# Patient Record
Sex: FEMALE | Race: WHITE
[De-identification: names, ages, dates, MRNs, and addresses within clinical notes are randomized per-mention and may not be internally consistent; named-entity substitution may affect disease eponyms.]

---

## 2020-02-02 ENCOUNTER — HOSPITAL ENCOUNTER (EMERGENCY)
Dept: HOSPITAL 75 - ER | Age: 50
LOS: 1 days | Discharge: HOME | End: 2020-02-03
Payer: SELF-PAY

## 2020-02-02 VITALS — BODY MASS INDEX: 39.55 KG/M2 | HEIGHT: 63.78 IN | WEIGHT: 228.84 LBS

## 2020-02-02 DIAGNOSIS — R07.9: Primary | ICD-10-CM

## 2020-02-02 LAB
ALBUMIN SERPL-MCNC: 3.9 GM/DL (ref 3.2–4.5)
ALP SERPL-CCNC: 60 U/L (ref 40–136)
ALT SERPL-CCNC: 14 U/L (ref 0–55)
APTT BLD: 30 SEC (ref 24–35)
BASOPHILS # BLD AUTO: 0 10^3/UL (ref 0–0.1)
BASOPHILS NFR BLD AUTO: 0 % (ref 0–10)
BILIRUB SERPL-MCNC: 0.2 MG/DL (ref 0.1–1)
BUN/CREAT SERPL: 18
CALCIUM SERPL-MCNC: 9.7 MG/DL (ref 8.5–10.1)
CHLORIDE SERPL-SCNC: 105 MMOL/L (ref 98–107)
CO2 SERPL-SCNC: 23 MMOL/L (ref 21–32)
CREAT SERPL-MCNC: 0.79 MG/DL (ref 0.6–1.3)
EOSINOPHIL # BLD AUTO: 0.2 10^3/UL (ref 0–0.3)
EOSINOPHIL NFR BLD AUTO: 2 % (ref 0–10)
ERYTHROCYTE [DISTWIDTH] IN BLOOD BY AUTOMATED COUNT: 14.1 % (ref 10–14.5)
GFR SERPLBLD BASED ON 1.73 SQ M-ARVRAT: > 60 ML/MIN
GLUCOSE SERPL-MCNC: 129 MG/DL (ref 70–105)
HCT VFR BLD CALC: 42 % (ref 35–52)
HGB BLD-MCNC: 13.7 G/DL (ref 11.5–16)
INR PPP: 1 (ref 0.8–1.4)
LYMPHOCYTES # BLD AUTO: 3 X 10^3 (ref 1–4)
LYMPHOCYTES NFR BLD AUTO: 38 % (ref 12–44)
MAGNESIUM SERPL-MCNC: 1.9 MG/DL (ref 1.6–2.4)
MANUAL DIFFERENTIAL PERFORMED BLD QL: NO
MCH RBC QN AUTO: 30 PG (ref 25–34)
MCHC RBC AUTO-ENTMCNC: 33 G/DL (ref 32–36)
MCV RBC AUTO: 92 FL (ref 80–99)
MONOCYTES # BLD AUTO: 0.7 X 10^3 (ref 0–1)
MONOCYTES NFR BLD AUTO: 8 % (ref 0–12)
NEUTROPHILS # BLD AUTO: 4.1 X 10^3 (ref 1.8–7.8)
NEUTROPHILS NFR BLD AUTO: 52 % (ref 42–75)
PLATELET # BLD: 281 10^3/UL (ref 130–400)
PMV BLD AUTO: 9.8 FL (ref 7.4–10.4)
POTASSIUM SERPL-SCNC: 3.8 MMOL/L (ref 3.6–5)
PROT SERPL-MCNC: 7.5 GM/DL (ref 6.4–8.2)
PROTHROMBIN TIME: 13.1 SEC (ref 12.2–14.7)
SODIUM SERPL-SCNC: 139 MMOL/L (ref 135–145)
WBC # BLD AUTO: 7.9 10^3/UL (ref 4.3–11)

## 2020-02-02 PROCEDURE — 85610 PROTHROMBIN TIME: CPT

## 2020-02-02 PROCEDURE — 80061 LIPID PANEL: CPT

## 2020-02-02 PROCEDURE — 93041 RHYTHM ECG TRACING: CPT

## 2020-02-02 PROCEDURE — 93005 ELECTROCARDIOGRAM TRACING: CPT

## 2020-02-02 PROCEDURE — 36415 COLL VENOUS BLD VENIPUNCTURE: CPT

## 2020-02-02 PROCEDURE — 71045 X-RAY EXAM CHEST 1 VIEW: CPT

## 2020-02-02 PROCEDURE — 83735 ASSAY OF MAGNESIUM: CPT

## 2020-02-02 PROCEDURE — 96375 TX/PRO/DX INJ NEW DRUG ADDON: CPT

## 2020-02-02 PROCEDURE — 80053 COMPREHEN METABOLIC PANEL: CPT

## 2020-02-02 PROCEDURE — 83874 ASSAY OF MYOGLOBIN: CPT

## 2020-02-02 PROCEDURE — 96374 THER/PROPH/DIAG INJ IV PUSH: CPT

## 2020-02-02 PROCEDURE — 85025 COMPLETE CBC W/AUTO DIFF WBC: CPT

## 2020-02-02 PROCEDURE — 85730 THROMBOPLASTIN TIME PARTIAL: CPT

## 2020-02-02 PROCEDURE — 84484 ASSAY OF TROPONIN QUANT: CPT

## 2020-02-03 VITALS — SYSTOLIC BLOOD PRESSURE: 151 MMHG | DIASTOLIC BLOOD PRESSURE: 86 MMHG

## 2020-02-03 LAB
CHOLEST SERPL-MCNC: 137 MG/DL (ref ?–200)
HDLC SERPL-MCNC: 54 MG/DL (ref 40–60)
TRIGL SERPL-MCNC: 86 MG/DL (ref ?–150)
VLDLC SERPL CALC-MCNC: 17 MG/DL (ref 5–40)

## 2020-02-03 NOTE — DIAGNOSTIC IMAGING REPORT
INDICATION: Chest pain.



COMPARISON: None.



FINDINGS: Single view of the chest demonstrates clear lungs

bilaterally. The heart is normal. There is no pneumothorax.

Osseous structures normal.



IMPRESSION: Negative chest.



Dictated by: 



  Dictated on workstation # [id number]

## 2020-02-03 NOTE — ED CHEST PAIN
General


Chief Complaint:  Chest Pain


Stated Complaint:  CP


Nursing Triage Note:  


Patient ambulatory to ER room 10 with spouse with complaint of chest pain that 


began tonight around 21:30. Patient states she was getting ready for bed when 


the chest pain started. She describes the pain as to the center of her chest 


radiating into her back and describes it as heaviness/tightness to the chest. 


Patient states the pain is worse with lying down. She did take Tums 1 hour ago. 


She does complain of nausea.


Nursing Sepsis Screen:  No Definite Risk


Source:  patient


Exam Limitations:  no limitations





History of Present Illness


Date Seen by Provider:  Feb 3, 2020





Allergies and Home Medications


Allergies


Coded Allergies:  


     No Known Drug Allergies (Verified  Allergy, Unknown, 5/26/09)





Past Medical-Social-Family Hx


Patient Social History


Alcohol Use:  Denies Use


Recreational Drug Use:  No


Smoking Status:  Never a Smoker


2nd Hand Smoke Exposure:  No


Recent Foreign Travel:  No


Contact w/Someone Who Travel:  No


Recent Infectious Disease Expo:  No


Recent Hopitalizations:  No


Physical Abuse:  No


Sexual Abuse:  No


Mistreated:  No


Fear:  No





Seasonal Allergies


Seasonal Allergies:  No





Past Medical History


Surgeries:  Yes (shoulder, knee)


Orthopedic


Respiratory:  Yes


Asthma


Cardiac:  No


Neurological:  No


Last Menstrual Period:  Jan 6, 2020


Reproductive Disorders:  No


Sexually Transmitted Disease:  Yes (HPV)


Genitourinary:  No


Gastrointestinal:  No


Musculoskeletal:  No


Endocrine:  No


Cancer:  No


Psychosocial:  No


Blood Disorders:  No





Physical Exam


Vital Signs





Vital Signs - First Documented








 2/2/20 2/3/20





 23:19 02:43


 


Temp 37.1 


 


Pulse 82 


 


Resp 18 


 


B/P (MAP) 168/101 (123) 


 


Pulse Ox  100


 


O2 Delivery Room Air 





Capillary Refill : Less Than 3 Seconds


Height, Weight, BMI


Height: '"


Weight: lbs. oz. kg; 39.00 BMI


Method:





Progress/Results/Core Measures


Results/Orders


Lab Results





Laboratory Tests








Test


 2/2/20


23:30 2/3/20


01:45 Range/Units


 


 


White Blood Count


 7.9 


 


 4.3-11.0


10^3/uL


 


Red Blood Count


 4.54 


 


 4.35-5.85


10^6/uL


 


Hemoglobin 13.7   11.5-16.0  G/DL


 


Hematocrit 42   35-52  %


 


Mean Corpuscular Volume 92   80-99  FL


 


Mean Corpuscular Hemoglobin 30   25-34  PG


 


Mean Corpuscular Hemoglobin


Concent 33 


 


 32-36  G/DL





 


Red Cell Distribution Width 14.1   10.0-14.5  %


 


Platelet Count


 281 


 


 130-400


10^3/uL


 


Mean Platelet Volume 9.8   7.4-10.4  FL


 


Neutrophils (%) (Auto) 52   42-75  %


 


Lymphocytes (%) (Auto) 38   12-44  %


 


Monocytes (%) (Auto) 8   0-12  %


 


Eosinophils (%) (Auto) 2   0-10  %


 


Basophils (%) (Auto) 0   0-10  %


 


Neutrophils # (Auto) 4.1   1.8-7.8  X 10^3


 


Lymphocytes # (Auto) 3.0   1.0-4.0  X 10^3


 


Monocytes # (Auto) 0.7   0.0-1.0  X 10^3


 


Eosinophils # (Auto)


 0.2 


 


 0.0-0.3


10^3/uL


 


Basophils # (Auto)


 0.0 


 


 0.0-0.1


10^3/uL


 


Prothrombin Time 13.1   12.2-14.7  SEC


 


INR Comment 1.0   0.8-1.4  


 


Activated Partial


Thromboplast Time 30 


 


 24-35  SEC





 


Sodium Level 139   135-145  MMOL/L


 


Potassium Level 3.8   3.6-5.0  MMOL/L


 


Chloride Level 105     MMOL/L


 


Carbon Dioxide Level 23   21-32  MMOL/L


 


Anion Gap 11   5-14  MMOL/L


 


Blood Urea Nitrogen 14   7-18  MG/DL


 


Creatinine


 0.79 


 


 0.60-1.30


MG/DL


 


Estimat Glomerular Filtration


Rate > 60 


 


  





 


BUN/Creatinine Ratio 18    


 


Glucose Level 129 H    MG/DL


 


Calcium Level 9.7   8.5-10.1  MG/DL


 


Corrected Calcium 9.8   8.5-10.1  MG/DL


 


Magnesium Level 1.9   1.6-2.4  MG/DL


 


Total Bilirubin 0.2   0.1-1.0  MG/DL


 


Aspartate Amino Transf


(AST/SGOT) 16 


 


 5-34  U/L





 


Alanine Aminotransferase


(ALT/SGPT) 14 


 


 0-55  U/L





 


Alkaline Phosphatase 60     U/L


 


Myoglobin


 20.8 


 


 10.0-92.0


NG/ML


 


Troponin I < 0.028  < 0.028  <0.028  NG/ML


 


Total Protein 7.5   6.4-8.2  GM/DL


 


Albumin 3.9   3.2-4.5  GM/DL


 


Triglycerides Level  86  <150  MG/DL


 


Cholesterol Level  137  < 200  MG/DL


 


LDL Cholesterol Direct  79  1-129  MG/DL


 


VLDL Cholesterol  17  5-40  MG/DL


 


HDL Cholesterol  54  40-60  MG/DL








My Orders





Orders - LISBET HOLCOMB MD


Cbc With Automated Diff (2/2/20 23:21)


Magnesium (2/2/20 23:21)


Chest 1 View, Ap/Pa Only (2/2/20 23:21)


Ekg Tracing (2/2/20 23:21)


Comprehensive Metabolic Panel (2/2/20 23:21)


Myoglobin Serum (2/2/20 23:21)


Protime With Inr (2/2/20 23:21)


Partial Thromboplastin Time (2/2/20 23:21)


O2 (2/2/20 23:21)


Monitor-Rhythm Ecg Trace Only (2/2/20 23:21)


Ed Iv/Invasive Line Start (2/2/20 23:21)


Troponin I (2/2/20 23:21)


Famotidine Injection (Pepcid Injection) (2/2/20 23:30)


Ondansetron Injection (Zofran Injectio (2/2/20 23:30)


Lidocaine 2% Viscous 15 Ml (Xylocaine Vi (2/2/20 23:30)


Antacid  Suspension (Mylanta  Suspension (2/2/20 23:30)


Aspirin Chewable Tablet (Baby Aspirin Ch (2/2/20 23:30)


Troponin I (2/3/20 01:30)


Lipid Panel (2/3/20 01:45)





Medications Given in ED





Current Medications








 Medications  Dose


 Ordered  Sig/Dany


 Route  Start Time


 Stop Time Status Last Admin


Dose Admin


 


 Al Hydrox/Mg


 Hydrox/Simethicone  30 ml  ONCE  ONCE


 PO  2/2/20 23:30


 2/2/20 23:32 DC 2/2/20 23:38


30 ML


 


 Aspirin  324 mg  ONCE  ONCE


 PO  2/2/20 23:30


 2/2/20 23:32 DC 2/2/20 23:35


324 MG


 


 Famotidine  20 mg  ONCE  ONCE


 IVP  2/2/20 23:30


 2/2/20 23:31 DC 2/2/20 23:38


20 MG


 


 Lidocaine HCl  15 ml  ONCE  ONCE


 PO  2/2/20 23:30


 2/2/20 23:32 DC 2/2/20 23:38


15 ML


 


 Ondansetron HCl  4 mg  ONCE  ONCE


 IVP  2/2/20 23:30


 2/2/20 23:32 DC 2/2/20 23:38


4 MG








Vital Signs/I&O











 2/2/20 2/3/20





 23:19 02:43


 


Temp 37.1 37.1


 


Pulse 82 68


 


Resp 18 18


 


B/P (MAP) 168/101 (123) 151/86 (123)


 


Pulse Ox  100


 


O2 Delivery Room Air 














Blood Pressure Mean:                    123








Initial ECG Impression Date:  Feb 2, 2020


Initial ECG Impression Time:  23:20


Initial ECG Rate:  81


Initial ECG Rhythm:  Normal Sinus


Initial ECG Intervals:  Normal


Initial ECG Impression:  Normal


Comment


Normal sinus rhythm with no ST elevation or depression.  No abnormal intervals 

or axis deviation.





Diagnostic Imaging





   Diagonstic Imaging:  Xray


   Plain Films/CT/US/NM/MRI:  chest


Comments


Chest x-ray viewed by me and report not yet available.  No acute abnormalities 

appreciated.





Departure


Impression





   Primary Impression:  


   Chest pain


   Qualified Codes:  R07.9 - Chest pain, unspecified


Disposition:  01 HOME, SELF-CARE


Condition:  Improved





Departure-Patient Inst.


Decision time for Depature:  02:35


Referrals:  


NO,LOCAL PHYSICIAN (PCP/Family)


Primary Care Physician


Patient Instructions:  Chest Pain (DC)





Add. Discharge Instructions:  


Follow-up with your primary care provider soon as possible for monitoring of 

your blood pressure and evaluation of your chest pain


Because your chest pain may be related to acid reflux, take an antacid 

over-the-counter such as Pepcid (famotidine) or Prilosec (omeprazole) 20 mg 

twice daily for the next couple of weeks.


Also avoid the following: Eating large meals, eating close to bedtime, caffeine,

carbonation, chocolate, citrus fruits and juices, tomato products, tobacco, 

alcohol, NSAID medications such as ibuprofen or naproxen, fatty or greasy foods,

mints, spicy food, or anything else you know irritate your stomach.


Take aspirin 81 mg daily until otherwise instructed by your doctor.


Return to the emergency room if you have worsening symptoms.














All discharge instructions reviewed with patient and/or family. Voiced 

understanding.





Copy


Copies To 1:   VALENTE LIZ JOSHUA T MD         Feb 3, 2020 02:37

## 2021-01-18 ENCOUNTER — HOSPITAL ENCOUNTER (OUTPATIENT)
Dept: HOSPITAL 75 - RAD | Age: 51
End: 2021-01-18
Attending: NURSE PRACTITIONER
Payer: COMMERCIAL

## 2021-01-18 DIAGNOSIS — Z12.31: Primary | ICD-10-CM

## 2021-01-18 PROCEDURE — 77063 BREAST TOMOSYNTHESIS BI: CPT

## 2021-01-18 PROCEDURE — 77067 SCR MAMMO BI INCL CAD: CPT

## 2021-01-18 NOTE — DIAGNOSTIC IMAGING REPORT
INDICATION: 

Routine screening.



COMPARISON:    

11/01/2016 and 11/05/2014.



TECHNIQUE: 

2D and 3D bilateral screening mammography was performed with CAD.



FINDINGS:

Scattered fibroglandular densities are identified bilaterally. A

benign nodule in the upper left breast is noted. There are no

spiculated masses or malignant appearing microcalcifications. The

axillae are unremarkable.



IMPRESSION: 

No mammographic features suspicious for malignancy are

identified.



ACR BI-RADS Category 2: Benign findings.

Result letter will be mailed to the patient.

Note: At least 10% of breast cancer is not imaged by mammography.



Dictated by: 



  Dictated on workstation # [id number]

## 2022-01-27 ENCOUNTER — HOSPITAL ENCOUNTER (OUTPATIENT)
Dept: HOSPITAL 75 - PREOP | Age: 52
LOS: 2 days | Discharge: HOME | End: 2022-01-29
Attending: SURGERY
Payer: COMMERCIAL

## 2022-01-27 VITALS — WEIGHT: 218.26 LBS | HEIGHT: 63.98 IN | BODY MASS INDEX: 37.26 KG/M2

## 2022-01-27 DIAGNOSIS — Z01.818: Primary | ICD-10-CM

## 2022-02-03 ENCOUNTER — HOSPITAL ENCOUNTER (OUTPATIENT)
Dept: HOSPITAL 75 - SDC | Age: 52
Discharge: HOME | End: 2022-02-03
Attending: SURGERY
Payer: COMMERCIAL

## 2022-02-03 VITALS — SYSTOLIC BLOOD PRESSURE: 133 MMHG | DIASTOLIC BLOOD PRESSURE: 79 MMHG

## 2022-02-03 VITALS — DIASTOLIC BLOOD PRESSURE: 75 MMHG | SYSTOLIC BLOOD PRESSURE: 126 MMHG

## 2022-02-03 VITALS — SYSTOLIC BLOOD PRESSURE: 136 MMHG | DIASTOLIC BLOOD PRESSURE: 75 MMHG

## 2022-02-03 VITALS — DIASTOLIC BLOOD PRESSURE: 74 MMHG | SYSTOLIC BLOOD PRESSURE: 125 MMHG

## 2022-02-03 VITALS — HEIGHT: 63.98 IN | WEIGHT: 218.26 LBS | BODY MASS INDEX: 37.26 KG/M2

## 2022-02-03 VITALS — SYSTOLIC BLOOD PRESSURE: 122 MMHG | DIASTOLIC BLOOD PRESSURE: 75 MMHG

## 2022-02-03 VITALS — DIASTOLIC BLOOD PRESSURE: 75 MMHG | SYSTOLIC BLOOD PRESSURE: 139 MMHG

## 2022-02-03 VITALS — DIASTOLIC BLOOD PRESSURE: 70 MMHG | SYSTOLIC BLOOD PRESSURE: 125 MMHG

## 2022-02-03 VITALS — SYSTOLIC BLOOD PRESSURE: 120 MMHG | DIASTOLIC BLOOD PRESSURE: 69 MMHG

## 2022-02-03 VITALS — SYSTOLIC BLOOD PRESSURE: 129 MMHG | DIASTOLIC BLOOD PRESSURE: 76 MMHG

## 2022-02-03 VITALS — SYSTOLIC BLOOD PRESSURE: 120 MMHG | DIASTOLIC BLOOD PRESSURE: 49 MMHG

## 2022-02-03 VITALS — SYSTOLIC BLOOD PRESSURE: 138 MMHG | DIASTOLIC BLOOD PRESSURE: 78 MMHG

## 2022-02-03 DIAGNOSIS — Z79.899: ICD-10-CM

## 2022-02-03 DIAGNOSIS — J45.909: ICD-10-CM

## 2022-02-03 DIAGNOSIS — I10: ICD-10-CM

## 2022-02-03 DIAGNOSIS — E66.9: ICD-10-CM

## 2022-02-03 DIAGNOSIS — K80.10: Primary | ICD-10-CM

## 2022-02-03 PROCEDURE — 88304 TISSUE EXAM BY PATHOLOGIST: CPT

## 2022-02-03 PROCEDURE — 76000 FLUOROSCOPY <1 HR PHYS/QHP: CPT

## 2022-02-03 PROCEDURE — 87081 CULTURE SCREEN ONLY: CPT

## 2022-02-03 PROCEDURE — 84703 CHORIONIC GONADOTROPIN ASSAY: CPT

## 2022-02-03 RX ADMIN — SODIUM CHLORIDE, SODIUM LACTATE, POTASSIUM CHLORIDE, AND CALCIUM CHLORIDE PRN MLS/HR: 600; 310; 30; 20 INJECTION, SOLUTION INTRAVENOUS at 08:22

## 2022-02-03 RX ADMIN — SODIUM CHLORIDE, SODIUM LACTATE, POTASSIUM CHLORIDE, AND CALCIUM CHLORIDE PRN MLS/HR: 600; 310; 30; 20 INJECTION, SOLUTION INTRAVENOUS at 11:36

## 2022-02-03 NOTE — DIAGNOSTIC IMAGING REPORT
INDICATION: Fluoroscopy during intraoperative cholangiogram.



Fluoroscopy was provided in the OR during intraoperative

cholangiogram. 12 seconds of fluoroscopic time was utilized. 62

images were obtained. Images demonstrate contrast being injected

via the cystic duct remnant. Intrahepatic and extrahepatic bile

ducts are normal caliber. There are no filling defects to suggest

retained stone. Contrast flows into the duodenum.



IMPRESSION: Fluoroscopy during intraoperative cholangiogram.



Dictated by: 



  Dictated on workstation # [id number]

## 2022-02-03 NOTE — DISCHARGE INST-SIMPLE/STANDARD
Discharge Inst-Standard


Discharge Medications


New, Converted or Re-Newed RX:  Transmitted to Pharmacy





Patient Instructions/Follow Up


Plan of Care/Instructions/FU:  


2-3 weeks ruthie


Activity as Tolerated:  No


Discharge Diet:  Regular Diet


Other Inst to Patient


Follow up Appt:


Make appointment for 2 weeks.





Instructions:


No lifting greater than 10 pounds.


No strenuous activity. 


May shower in 24 hours, no tub bath or soaking.


Use incentive spirometer at home as directed.


No Smoking





Skin/Wound Care:


You have special glue over incision, it will fall off on it's own.





Symptoms to Report:


Appetite Changes, Extremity Discoloration, Numbness/Tingling, Swelling 

Increased, Bleeding Excessive, Eyesight Changes, Pain Increased, Urine Color 

Change, Constipation(Persistent), Fever over 101 degree F, Pain/Pressure in 

chest, Urinating Difficulty, Cough Up/Vomit Blood, Heart Beat Irreg/Pounding, 

Pain/Pressure in jaw, Vaginal Bleeding Increase, Cramps in feet or legs, 

Lightheadedness, Pain/Pressure in shoulder, Diarrhea(Persistent), Memory Changes

Suddenly, Questions/Concerns, Weight gain consecutive days, Dizziness/Fainting, 

Nausea/Vomiting, Shortness of Breath, Weight gain over 2 pounds.





If eyes or skin turn yellow notify physician.








If questions or concerns contact your physician 


Or seek help at emergency department.











JOCY HUI DO               Feb 3, 2022 10:34

## 2022-02-03 NOTE — PROGRESS NOTE-POST OPERATIVE
Post-Operative Progess Note


Surgeon (s)/Assistant (s)


Surgeon


JOCY HUI DO


Assistant:  Dr. Palafox to assist in retraction dissection and closure.





Pre-Operative Diagnosis


cholelithiasis





Post-Operative Diagnosis





same





Procedure & Operative Findings


Date of Procedure


2/3/22


Procedure Performed/Findings


  


PROCEDURE:


Laparoscopic cholecystectomy with intraoperative


cholangiogram. 


 


COMPLICATIONS:


None. 


 


PROCEDURE:


The patient was taken to the operating suite and was prepped and


draped in sterile fashion. A surgical pause was performed. Just


superior to the umbilicus, a 12 mm incision was made. Dissection


was taken down to the fascia, which was then scored and grasped


with a Kocher and the abdomen was then entered.  A 0 Vicryl


suture was placed in a figure-of-eight fashion and a [place]


trocar was placed and secured. Pneumoperitoneum was achieved.


A 5mm trochar place in the subxyphoid and 2 in the right upper quadrant.


The gallbladder was then grasped and elevated. The


cystic duct, and cystic artery were then 


dissected out. Clip was placed on the distal


portion of the cystic duct which was then partially transected.


An arrow catheter was inserted into the duct. 


The cholangiogram was then performed. No filing defects and contrast


made its way into the duodenum.  Catheter removed. Clips were placed


on proximal portion of the cystic duct and then the duct was then


transected. Clips were placed along the proximal and distal


portion of the cystic artery which was then transected. Hook


cautery was used to dissect the gallbladder from the gallbladder


fossa achieving hemostasis. The gallbladder was placed in an


Endobag and removed through the 12 mm trocar site. The abdomen


was then reinspected.  Copious amounts of irrigation were used to


irrigate the abdomen and there were no signs of active bleeding.


Slight ooze from gallbladder fossa so piece of surgicel placed in it.


Hemostasis had been achieved. The 12 mm fascial defect was then


closed with 0 Vicryl suture that had been placed in a


figure-of-eight fashion. The abdomen was then desufflated, the


trocars were removed. The abdomen was then washed and dried. The


skin was then closed using 4-0 Monocryl in a subcuticular fashion.


The abdomen was washed and dried and Skin Affix was place over incisions.


Patient tolerated the procedure well without any complications 


and was taken to the recovery room in stable condition.


Anesthesia Type


general





Estimated Blood Loss


Estimated blood loss (mL):  minimal





Specimens/Packing


Specimens Removed


gallbladder


Packing:  


surgshakirl











JOCY HUI DO               Feb 3, 2022 10:36

## 2022-02-03 NOTE — PROGRESS NOTE-PRE OPERATIVE
Pre-Operative Progress Note


H&P Reviewed


The H&P was reviewed, patient examined and no changes noted.


Date Seen by Provider:  Feb 3, 2022


Time Seen by Provider:  08:00


Date H&P Reviewed:  Feb 3, 2022


Time H&P Reviewed:  08:00


Pre-Operative Diagnosis:  cholelithiasis











JOCY HUI DO               Feb 3, 2022 08:00

## 2022-02-03 NOTE — ANESTHESIA-GENERAL POST-OP
General


Patient Condition


Mental Status/LOC:  Same as Preop


Cardiovascular:  Satisfactory


Nausea/Vomiting:  Absent


Respiratory:  Satisfactory


Pain:  Controlled


Complications:  Absent





Post Op Complications


Complications


None





Follow Up Care/Instructions


Patient Instructions


None needed.





Anesthesia/Patient Condition


Patient Condition


Patient is doing well, no complaints, stable vital signs, no apparent adverse 

anesthesia problems.   


No complications reported per nursing.


D/C home per [facility] Criteria:  Yes











LUCRECIA HUSSEIN CRNA            Feb 3, 2022 11:44

## 2022-02-22 ENCOUNTER — HOSPITAL ENCOUNTER (OUTPATIENT)
Dept: HOSPITAL 75 - RAD | Age: 52
End: 2022-02-22
Attending: PHYSICIAN ASSISTANT
Payer: COMMERCIAL

## 2022-02-22 DIAGNOSIS — Z12.31: Primary | ICD-10-CM

## 2022-02-22 PROCEDURE — 77063 BREAST TOMOSYNTHESIS BI: CPT

## 2022-02-22 PROCEDURE — 77067 SCR MAMMO BI INCL CAD: CPT

## 2022-02-22 NOTE — DIAGNOSTIC IMAGING REPORT
INDICATION: 

Routine screening.



COMPARISON:  

01/18/2021 and 11/01/2016.



TECHNIQUE: 

2D and 3D bilateral screening mammography was performed with CAD.



FINDINGS:

Scattered fibroglandular densities are identified bilaterally.

The parenchymal pattern is stable. No dominant mass or

malignant-appearing microcalcifications are seen. Axillae are

unremarkable.



IMPRESSION: 

No mammographic features suspicious for malignancy are

identified.



ACR BI-RADS Category 1: Negative.

Result letter will be mailed to the patient.

Note:  At least 10% of breast cancer is not imaged by

mammography.



Dictated by: 



  Dictated on workstation # [id number]

## 2022-03-24 ENCOUNTER — HOSPITAL ENCOUNTER (OUTPATIENT)
Dept: HOSPITAL 75 - ORTHO | Age: 52
End: 2022-03-24
Attending: ORTHOPAEDIC SURGERY
Payer: COMMERCIAL

## 2022-03-24 DIAGNOSIS — M17.11: Primary | ICD-10-CM

## 2022-03-24 PROCEDURE — 99203 OFFICE O/P NEW LOW 30 MIN: CPT

## 2022-07-20 ENCOUNTER — HOSPITAL ENCOUNTER (OUTPATIENT)
Dept: HOSPITAL 75 - RAD | Age: 52
End: 2022-07-20
Attending: NURSE PRACTITIONER
Payer: COMMERCIAL

## 2022-07-20 DIAGNOSIS — Z96.651: Primary | ICD-10-CM

## 2022-07-20 DIAGNOSIS — Z86.718: ICD-10-CM

## 2022-07-20 NOTE — DIAGNOSTIC IMAGING REPORT
INDICATION: DVT. Status post previous total knee arthroplasty.



COMPARISON: None.



TECHNIQUE: Duplex, gray-scale and color-flow imaging of the right

lower extremity venous system was performed. 



FINDINGS: The common femoral vein, superficial femoral vein,

profunda femoris, and popliteal veins are normal. These vessels

show normal compressibility, color flow, and Doppler

augmentation. The deep calf veins, although not very well seen,

demonstrate no distinct intraluminal thrombus. 



IMPRESSION:  Negative venous Doppler of the right lower

extremity.



Dictated by: 



  Dictated on workstation # [id number]

## 2022-12-20 ENCOUNTER — HOSPITAL ENCOUNTER (OUTPATIENT)
Dept: HOSPITAL 75 - PREOP | Age: 52
End: 2022-12-20
Attending: SURGERY
Payer: COMMERCIAL

## 2022-12-20 VITALS — HEIGHT: 64.02 IN | WEIGHT: 214.51 LBS | BODY MASS INDEX: 36.62 KG/M2

## 2022-12-20 DIAGNOSIS — Z01.818: Primary | ICD-10-CM

## 2022-12-27 ENCOUNTER — HOSPITAL ENCOUNTER (OUTPATIENT)
Dept: HOSPITAL 75 - ENDO | Age: 52
Discharge: HOME | End: 2022-12-27
Attending: SURGERY
Payer: COMMERCIAL

## 2022-12-27 VITALS — SYSTOLIC BLOOD PRESSURE: 121 MMHG | DIASTOLIC BLOOD PRESSURE: 78 MMHG

## 2022-12-27 VITALS — DIASTOLIC BLOOD PRESSURE: 73 MMHG | SYSTOLIC BLOOD PRESSURE: 119 MMHG

## 2022-12-27 VITALS — SYSTOLIC BLOOD PRESSURE: 142 MMHG | DIASTOLIC BLOOD PRESSURE: 85 MMHG

## 2022-12-27 VITALS — HEIGHT: 64.02 IN | WEIGHT: 214.51 LBS | BODY MASS INDEX: 36.62 KG/M2

## 2022-12-27 DIAGNOSIS — E66.9: ICD-10-CM

## 2022-12-27 DIAGNOSIS — Z12.11: Primary | ICD-10-CM

## 2022-12-27 DIAGNOSIS — K57.30: ICD-10-CM

## 2022-12-27 NOTE — DISCHARGE INST-SIMPLE/STANDARD
Discharge Inst-Standard


Patient Instructions/Follow Up


Plan of Care/Instructions/FU:  


Follow-up for repeat colonoscopy in 10 years. If any issues prior to that,


please contact Dr. Wood's office.


Activity as Tolerated:  Yes


Discharge Diet:  Regular Diet (High fiber)











JOCY WOOD DO              Dec 27, 2022 10:17

## 2022-12-27 NOTE — OPERATIVE REPORT
DATE OF SERVICE: 12/27/2022



PREOPERATIVE DIAGNOSIS:

Screening colonoscopy.



POSTOPERATIVE DIAGNOSIS:

Diverticulosis.



PROCEDURE:

Colonoscopy.



SURGEON:

Jocy Wood DO.



ANESTHESIA:

Per CRNA.



ESTIMATED BLOOD LOSS:

None.



COMPLICATIONS:

None.



INDICATIONS:

The patient is a 52-year-old female with the need for screening colonoscopy.  

She understands risks and benefits of procedure and wished to proceed.  Consent 

was signed in chart.



DESCRIPTION OF PROCEDURE:

The patient was taken to the endoscopy suite, placed in left lateral position.  

Timeout was performed.  Digital rectal exam was performed.  No palpable polyps, 

masses or ulcerations.  Scope was inserted in the rectum and advanced all the 

way to the cecum with minimal difficulty.  Prep was adequate.  Scope was then 

slowly retracted back.  No polyps, masses or ulcerations in the cecum, 

ascending, transverse, descending and sigmoid colon.  Very minimal amount of 

diverticulosis throughout the colon.  Once in the rectum, scope was retroflexed,

noting no other pathology.  Scope returned to its normal position, slowly 

withdrawn until completely removed.  The patient tolerated the procedure well 

without complications.  She was taken to recovery room in stable condition.



RECOMMENDATIONS:

The patient will have repeat colonoscopy in 10 years.  Any issues before that be

seen at that time.





Job ID: [id number]

DocumentID: [id number]

Dictated Date: 12/27/2022 10:27:36

Transcription Date: 12/27/2022 15:40:00

Dictated By: JOCY WOOD DO

## 2022-12-27 NOTE — ANESTHESIA-GENERAL POST-OP
MAC


Patient Condition


Mental Status/LOC:  Same as Preop


Cardiovascular:  Satisfactory


Nausea/Vomiting:  Absent


Respiratory:  Satisfactory


Pain:  Controlled


Complications:  Absent





Post Op Complications


Complications


None





Follow Up Care/Instructions


Patient Instructions


None needed.





Anesthesiology Discharge Order


Discharge Order


Patient is doing well, no complaints, stable vital signs, no apparent adverse 

anesthesia problems.   


No complications reported per nursing.











JOHN DANIELS CRNA         Dec 27, 2022 12:12

## 2023-03-20 ENCOUNTER — HOSPITAL ENCOUNTER (OUTPATIENT)
Dept: HOSPITAL 75 - RAD | Age: 53
End: 2023-03-20
Attending: NURSE PRACTITIONER
Payer: COMMERCIAL

## 2023-03-20 DIAGNOSIS — Z12.31: Primary | ICD-10-CM

## 2023-03-20 PROCEDURE — 77067 SCR MAMMO BI INCL CAD: CPT

## 2023-03-20 PROCEDURE — 77063 BREAST TOMOSYNTHESIS BI: CPT

## 2023-03-21 NOTE — DIAGNOSTIC IMAGING REPORT
3D bilateral screening mammogram with CAD.



This study was compared to the prior exams of 02/22/2022,

1/18/2021 and 11/1/2016. 



At this time there are no current complaints. 



The current study was also evaluated with a Computer Aided

Detection (CAD) system.



FINDINGS: There are scattered fibroglandular densities in both

breasts which could obscure a lesion. Overall, there does not

appear to have been any significant change when compared to the

prior exam. No primary or secondary sign of malignancy is noted.



IMPRESSION: There is no radiographic evidence for malignancy.



ACR BI-RADS Category 1: Negative.

Result letter will be mailed to the patient.

Note:  At least 10% of breast cancer is not imaged by

mammography.



Dictated by: 



  Dictated on workstation # [id number]

## 2023-05-08 ENCOUNTER — HOSPITAL ENCOUNTER (EMERGENCY)
Dept: HOSPITAL 75 - ER | Age: 53
Discharge: HOME | End: 2023-05-08
Payer: COMMERCIAL

## 2023-05-08 VITALS — SYSTOLIC BLOOD PRESSURE: 111 MMHG | DIASTOLIC BLOOD PRESSURE: 73 MMHG

## 2023-05-08 DIAGNOSIS — M25.561: Primary | ICD-10-CM
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
## 2023-05-08 DIAGNOSIS — Z96.651: ICD-10-CM
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
## 2023-05-08 DIAGNOSIS — M25.461: ICD-10-CM
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
## 2023-05-08 PROCEDURE — 73562 X-RAY EXAM OF KNEE 3: CPT

## 2023-05-08 PROCEDURE — 99283 EMERGENCY DEPT VISIT LOW MDM: CPT

## 2023-05-08 NOTE — DIAGNOSTIC IMAGING REPORT
Indication: Right knee pain



AP, oblique, and lateral views of the right knee are obtained.



No fracture or acute bony abnormality seen. Right knee prosthesis

appears in good alignment. There is no sign of device loosening.



Impression:  Well-aligned right knee prosthesis with no acute

bone abnormality.



Dictated by: 



  Dictated on workstation # [id number]

## 2023-05-08 NOTE — ED LOWER EXTREMITY
General


Chief Complaint:  Lower Extremity


Stated Complaint:  RT KNEE INJ | WC





History of Present Illness


Date Seen by Provider:  May 8, 2023


Time Seen by Provider:  14:08


Initial Comments


Patient is a 52-year-old female who presents to the emergency room with a chief 

complaint of knee pain.  Patient is status post knee replacement July of last 

year.  Dr. Harris at [facility] states did her knee replacement.  She states she 

was at work today sitting on the floor and went to get up and felt and heard a 

pop.  She has had pain with ambulation since.  No numbness tingling or weakness.

 She reports anterior and posterior knee pain.  Did not fall.  No direct trauma.

 No other complaints of pain or illness.


Onset:  this afternoon


Severity:  moderate


Pain/Injury Location:  right knee


Method of Injury:  twisted


Modifying Factors:  Improves With Immobilization





Allergies and Home Medications


Allergies


Coded Allergies:  


     No Known Drug Allergies (Verified , 12/20/22)





Patient Home Medication List


Home Medication List Reviewed:  Yes


Cetirizine HCl (Zyrtec) 10 Mg Capsule, 10 MG PO DAILY PRN for PRN, (Reported)


   Entered as Reported by: DARRICK PHAN on 1/29/22 1102


Fluticasone Propionate (Flonase Allergy Relief) 50 Mcg/Actuation Ferney.susp, 1 

SPRAY NS DAILY, (Reported)


   Entered as Reported by: CHANTELL COX on 12/20/22 1552


Lisinopril (Lisinopril) 10 Mg Tablet, 10 MG PO DAILY, (Reported)


   Entered as Reported by: DARRICK PHAN on 1/29/22 1102


Meloxicam (Meloxicam) 15 Mg Tablet, 15 MG PO UD, (Reported)


   Entered as Reported by: CHANTELL COX on 12/20/22 1552





Review of Systems


Constitutional:  see HPI


Respiratory:  no symptoms reported


Cardiovascular:  no symptoms reported


Gastrointestinal:  no symptoms reported


Musculoskeletal:  joint pain (right knee)


Skin:  no symptoms reported





Past Medical-Social-Family Hx


Patient Social History


Tobacco Use?:  No


Use of E-Cig and/or Vaping dev:  No


Substance use?:  No


Alcohol Use?:  No


Pt feels they are or have been:  No





Immunizations Up To Date


First/Initial COVID19 Vaccinat:  2021


Second COVID19 Vaccination Sonny:  2021


Third COVID19 Vaccination Date:  MODERNA 12/03/2021





Seasonal Allergies


Seasonal Allergies:  No





Past Medical History


Surgery/Hospitalization HX:  


arhtirits, htn





r knee repalcement july 2022


Surgeries:  Yes (shoulder SCOPE RIGHT , knee RIGHT TKR)


Gallbladder, Orthopedic


Respiratory:  Yes (HX USE OF INHALERS, NOT CURRENTLY USING)


Asthma


Currently Using CPAP:  No


Currently Using BIPAP:  No


Cardiac:  Yes


Hypertension


Neurological:  No


Reproductive Disorders:  No


Sexually Transmitted Disease:  Yes (HPV)


Genitourinary:  Yes


Kidney Stones


Gastrointestinal:  Yes


Gastroesophageal Reflux, Gall Bladder Disease


Musculoskeletal:  Yes


Degenerate Disk Disease, Arthritis


Endocrine:  No


HEENT:  No


Cancer:  No


Psychosocial:  No


Integumentary:  No


Blood Disorders:  No





Family Medical History


Heart Disease





Physical Exam


Vital Signs


Capillary Refill :


Height, Weight, BMI


Height: '"


Weight: lbs. oz. kg; 36.80 BMI


Method:


General Appearance:  WD/WN, no apparent distress


HEENT:  PERRL/EOMI


Cardiovascular:  regular rate, rhythm


Respiratory:  no respiratory distress, no accessory muscle use


Hips:  bilateral hip non-tender, bilateral hip normal inspection, bilateral hip 

normal range of motion, bilateral hip no evidence of injury


Knees:  right knee other (increased warmth over anterior knee joint. minimal 

swelling. no anterior knee tenderess. some tenderness in posterior popliteal 

area; no distal ecema, tenderness)


Feet:  bilateral foot non-tender, bilateral foot normal inspection, bilateral 

foot normal range of motion, bilateral foot no evidence of injury


Neurologic/Psychiatric:  alert, normal mood/affect, oriented x 3


Skin:  normal color, warm/dry





Progress/Results/Core Measures


Results/Orders


My Orders





Orders - EMIGDIO RENTERIA MD


Knee, Right, 3 Views (5/8/23 14:10)








Diagnostic Imaging





   Diagonstic Imaging:  Xray


Comments


Knee xray - interpreted by me - no fracture/dislocation





Departure


Impression





   Primary Impression:  


   Knee pain, acute


   Qualified Codes:  M25.561 - Pain in right knee


Disposition:  01 HOME, SELF-CARE


Condition:  Stable





Departure-Patient Inst.


Decision time for Depature:  14:36


Referrals:  


[facility] (PCP/Family)


Primary Care Physician


Patient Instructions:  Knee Pain ED





Add. Discharge Instructions:  


Ice pack to the right knee off and on for the next 48 hours.





Crutch walking with toe touch weight bearing until follow up with Dr Harris.





Take ibuprofen 3 pills (600mg) every 6 hours with food or over the counter 

alleve 2 pills twice a day with food for pain.





Call and make a follow up appointment with Dr Harris for next week.





Return to the Emergency Department for any new, concerning or emergent 

complaints.


Work/School Note:  Work Release Form   Date Seen in the Emergency Department:  

May 8, 2023


   Return to Work:  May 10, 2023





Copy


Copies To 1:   VALENTE LIZ KATHRYN M MD          May 8, 2023 14:18